# Patient Record
(demographics unavailable — no encounter records)

---

## 2017-10-10 NOTE — PDOC
History of Present Illness





- General


Chief Complaint: Pain, Acute


Stated Complaint: FLANK PAIN


Time Seen by Provider: 10/10/17 20:04


History Source: Patient,  Used


Exam Limitations: Language Barrier





- History of Present Illness


Travel History: No


Initial Comments: 





10/10/17 20:15





24yo Male patient with no significant past medical history presents to ED c/o 

Rt flank pain radiating to groin with pain on urination. Patient states 

symptoms began this afternoon with associated diarrhea x 4, and urinary 

frequency. He denies n/v, fever, hematuria, dysuria, rectal bleeding or any 

other complaints at this time. 


Timing/Duration: reports: constant.  denies: getting worse, changing over time, 

intermittent, resolved prior to arrival, gone now, other


Quality: reports: moderate.  denies: mild, severe, aching, burning, cramping, 

dullness, fullness, sharpness, stabbing, throbbing, other


Abdominal Pain Onset Location: reports: flank.  denies: RUQ, LUQ, RLQ, LLQ, 

epigastric, periumbilical, suprapubic, generalized abdomen, unknown, other


Pain Radiation: reports: groin.  denies: no radiation, RUQ, LUQ, RLQ, LLQ, 

epigastric, periumbilical, flank, scapula, shoulder, chest, back, other


Activities at Onset: reports: no specific activity.  denies: none, exertion, 

emotional upset, rest, sleep, eating, working, sexual intercourse, other


Treatment  Prior to Arrive: worse with: analgesics, antacids, cold pack, heat, 

laxative, enema, other





Past History





- Travel


Traveled outside of the country in the last 30 days: No


Close contact w/someone who was outside of country & ill: No





- Past Medical History


Allergies/Adverse Reactions: 


 Allergies











Allergy/AdvReac Type Severity Reaction Status Date / Time


 


No Known Allergies Allergy   Verified 10/10/17 18:47











Home Medications: 


Ambulatory Orders





Acetaminophen [Tylenol .Extra-Strength -] 500 mg PO AM 11/02/15 


Ibuprofen [Motrin -] 600 mg PO QID #28 tablet 11/02/15 


Cyclobenzaprine HCl [Flexeril 10 mg] 10 mg PO Q8H PRN #15 tablet 10/11/17 


Ibuprofen 600 mg PO Q6H PRN #20 tablet 10/11/17 








Other medical history: DENIES





- Immunization History


Immunization Up to Date: No





- Suicide/Smoking/Psychosocial Hx


Smoking History: Never smoked


Have you smoked in the past 12 months: No


Hx Alcohol Use: No


Drug/Substance Use Hx: No


Substance Use Type: None





Abd/GI Specific PMHX





- Complaint Specific PMHX


Colitis: No


Diverticulitis: No


Gall Bladder Disease: No


GERD: No


Hepatitis: No


Irritable Bowel Synd (IBS): No


Pancreatitis: No


GI Ulcer Disease: No





**Review of Systems





- Review of Systems


Able to Perform ROS?: Yes


Is the patient limited English proficient: No


ABD/GI: No: Nausea, Poor Appetite, Poor Fluid Intake, Rectal Bleeding, Vomiting

, Abdominal cramping


: Yes: Frequency, Flank Pain, Pain, Testicular Pain.  No: Burning, Dysuria, 

Discharge, Hematuria, Urgency, Testicular Mass, Testicular Swelling


Musculoskeletal: Yes: Back Pain.  No: Muscle Pain, Muscle Weakness


All Other Systems: Reviewed and Negative





*Physical Exam





- Vital Signs


 Last Vital Signs











Temp Pulse Resp BP Pulse Ox


 


 99.1 F   81   19   123/88   99 


 


 10/10/17 18:46  10/10/17 18:46  10/10/17 18:46  10/10/17 18:46  10/10/17 18:46














- Physical Exam


General Appearance: Yes: Nourished, Appropriately Dressed, Apparent Distress, 

Mild Distress.  No: Moderate Distress, Severe Distress


Respiratory/Chest: positive: Lungs Clear, Normal Breath Sounds.  negative: 

Chest Tender, Respiratory Distress, Accessory Muscle Use, Labored Respiration, 

Rapid RR


Cardiovascular: positive: Regular Rhythm, Regular Rate


Gastrointestinal/Abdominal: positive: Normal Bowel Sounds, Tender, Soft, 

Tenderness (RLQ).  negative: Distended, Guarding, Rebound


Male Genitalia: positive: normal genitalia, testicular tenderness (Right 

testicle ).  negative: discharge, testicular mass, epididymus tender, inguinal 

hernia, hematuria


Musculoskeletal: positive: Normal Inspection, CVA Tenderness, CVA Tenderness (R)

.  negative: CVA Tenderness (L), Decreased Range of Motion, Vertebral Tenderness


Extremity: positive: Normal Capillary Refill, Normal Inspection, Normal Range 

of Motion.  negative: Pedal Edema, Swelling, Calf Tenderness, Erythema, 

Inflammation


Integumentary: positive: Normal Color, Dry, Warm


Neurologic: positive: CNs II-XII NML intact, Fully Oriented, Alert, Normal Mood/

Affect, Normal Response, Motor Strength 5/5





ED Treatment Course





- LABORATORY


CBC & Chemistry Diagram: 


 10/10/17 20:21





 10/10/17 20:21





- RADIOLOGY


Radiology Studies Ordered: 














 Category Date Time Status


 


 ABDOMEN & PELVIS CT WITH CONTR [CT] Stat CT Scan  10/10/17 20:13 Ordered


 


 SCROTUM AND CONTENTS US [US] Stat Ultrasound  10/10/17 20:13 Ordered














Medical Decision Making





- Medical Decision Making





10/11/17 00:21





Patient reports feeling much better. No pain at this time. CT/Abd Pelvis: WNL, 

Ultrasound: WNL UA, CBC (leukocytosis), CMP: WNL. Otherwise unremarkable work-

up. 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 Patient 

needs referral to PCP. Will d/c home on antiinflammatory and muscle relaxer.





*DC/Admit/Observation/Transfer


Diagnosis at time of Disposition: 


 Muscle strain





Back pain


Qualifiers:


 Back pain location: low back pain Chronicity: acute Back pain laterality: 

right Sciatica presence: without sciatica Qualified Code(s): M54.5 - Low back 

pain; M54.5 - Low back pain





- Discharge Dispostion


Disposition: HOME


Condition at time of disposition: Improved


Admit: No





- Prescriptions


Prescriptions: 


Cyclobenzaprine HCl [Flexeril 10 mg] 10 mg PO Q8H PRN #15 tablet


 PRN Reason: Back Pain


Ibuprofen 600 mg PO Q6H PRN #20 tablet


 PRN Reason: Back Pain





- Referrals


Referrals: 


Juancarlos Bhatia MD [Staff Physician] - 





- Patient Instructions


Printed Discharge Instructions:  DI for Low Back Pain


Additional Instructions: 


Seguimiento con el Dr. Bhatia para establecer el cuidado. Llame para programar 

chemo esta semana. Oso los medicamentos segn lo prescrito. He enviado mariluz 

medicamentos a harmon farmacia. Oso duchas calientes, descanse y regrese al 

servicio de urgencias si mariluz sntomas empeoran o cualquier preocupacin por jonathan 

evaluacin ms unda.





Follow up with Dr. Bhatia to establish care. Call to schedule appointment this 

week. Take medications as prescribed. I have sent your medications to your 

pharmacy. Take warm showers, rest and return to emergency department if your 

symptoms worsen or any concerns for further evaluation.


Print Language: ENGLISH